# Patient Record
Sex: FEMALE
[De-identification: names, ages, dates, MRNs, and addresses within clinical notes are randomized per-mention and may not be internally consistent; named-entity substitution may affect disease eponyms.]

---

## 2018-06-29 ENCOUNTER — HOSPITAL ENCOUNTER (EMERGENCY)
Dept: HOSPITAL 68 - ERH | Age: 28
End: 2018-06-29
Payer: COMMERCIAL

## 2018-06-29 VITALS — SYSTOLIC BLOOD PRESSURE: 104 MMHG | DIASTOLIC BLOOD PRESSURE: 55 MMHG

## 2018-06-29 DIAGNOSIS — R10.11: Primary | ICD-10-CM

## 2018-06-29 DIAGNOSIS — R10.2: ICD-10-CM

## 2018-06-29 DIAGNOSIS — R10.33: ICD-10-CM

## 2018-06-29 LAB
ABSOLUTE GRANULOCYTE CT: 7.4 /CUMM (ref 1.4–6.5)
BASOPHILS # BLD: 0.1 /CUMM (ref 0–0.2)
BASOPHILS NFR BLD: 0.7 % (ref 0–2)
EOSINOPHIL # BLD: 0.1 /CUMM (ref 0–0.7)
EOSINOPHIL NFR BLD: 1.3 % (ref 0–5)
ERYTHROCYTE [DISTWIDTH] IN BLOOD BY AUTOMATED COUNT: 15.3 % (ref 11.5–14.5)
GRANULOCYTES NFR BLD: 68.5 % (ref 42.2–75.2)
HCT VFR BLD CALC: 41.8 % (ref 37–47)
LYMPHOCYTES # BLD: 2.6 /CUMM (ref 1.2–3.4)
MCH RBC QN AUTO: 27.4 PG (ref 27–31)
MCHC RBC AUTO-ENTMCNC: 33.3 G/DL (ref 33–37)
MCV RBC AUTO: 82.3 FL (ref 81–99)
MONOCYTES # BLD: 0.6 /CUMM (ref 0.1–0.6)
PLATELET # BLD: 197 /CUMM (ref 130–400)
PMV BLD AUTO: 9.5 FL (ref 7.4–10.4)
RED BLOOD CELL CT: 5.07 /CUMM (ref 4.2–5.4)
WBC # BLD AUTO: 10.8 /CUMM (ref 4.8–10.8)

## 2018-06-29 PROCEDURE — 87070 CULTURE OTHR SPECIMN AEROBIC: CPT

## 2018-06-29 NOTE — ED GI/GU/ABDOMINAL COMPLAINT
History of Present Illness
 
General
Chief Complaint: Abdominal Pain/Flank Pain
Stated Complaint: STOMACH PAIN, BREAST TENDERNESS +NAUSEA
Source: patient, old records
Exam Limitations: no limitations
 
Vital Signs & Intake/Output
Vital Signs & Intake/Output
 Vital Signs
 
 
Date Time Temp Pulse Resp B/P B/P Pulse O2 O2 Flow FiO2
 
     Mean Ox Delivery Rate 
 
06/29 2346 98.8 57 20 104/55  100 Room Air  
 
06/29 2049 99.0 84 20 102/60  98   
 
 
 
Allergies
Coded Allergies:
No Known Allergies (01/08/18)
 
Reconcile Medications
Ibuprofen 800 MG TABLET   1 TAB PO TID pain
Ibuprofen 600 MG TABLET   1 TAB PO TID PRN pain
     with food
Phenazopyridine HCl (Pyridium) 200 MG TABLET   1 TAB PO TID uti
Sulfamethoxazole/Trimethoprim (Bactrim Ds Tablet) 800 MG-160 MG TABLET   1 TAB 
PO BID uti
 
Triage Note:
PER PT "UTERUS PAIN" X 2 WEEKS LMP 6/14 ALSO
 NAUSEA AND LIGHTHEADNESS
Triage Nurses Notes Reviewed? yes
Pregnant? n
Is pt currently breastfeeding? No
Onset: Gradual
Duration: week(s):
Timing: recent history
Quality/Severity: moderate
Severity Numbers: 6
Location: periumbilical, right upper quadrant, suprapubic
HPI:
28-year-old female presents to emergency department complaining of "pain in 
uterus" 2.5 weeks.  Patient states she is having left upper quadrant and 
periumbilical pain beginning a few days ago.  Patient saw her OB/GYN 2 weeks ago
, transvaginal ultrasound was performed, patient states they noted that her 
right ovary was close to her uterus.  No swabs were performed at that time.  
Patient also reports painful intercourse recently.  Patient reports nausea and 
fevers yesterday.  The patient denies dysuria, hematuria, urinary frequency, 
vaginal discharge, new sexual partners, vomiting, diarrhea, constipation.  Last 
menstrual period 2 weeks ago.  Last bowel movement 2 days ago.
(Manjula Cali)
 
Past History
 
Travel History
Traveled to [place] past 21 day No
 
Medical History
Any Pertinent Medical History? none
Neurological: NONE
EENT: NONE
Cardiovascular: NONE
Respiratory: NONE
Gastrointestinal: NONE
Hepatic: NONE
Renal: NONE
Musculoskeletal: NONE
Psychiatric: NONE
Endocrine: NONE
Blood Disorders: NONE
Cancer(s): NONE
GYN/Reproductive: NONE
 
Surgical History
Surgical History: appendectomy
 
Psychosocial History
What is your primary language English
Tobacco Use: Never used
 
Family History
Hx Contributory? No
(Manjula Cali)
 
Review of Systems
 
Review of Systems
Constitutional:
Reports: see HPI. 
EENTM:
Reports: no symptoms. 
Respiratory:
Reports: no symptoms. 
Cardiovascular:
Reports: no symptoms. 
GI:
Reports: see HPI. 
Genitourinary:
Reports: see HPI. 
Musculoskeletal:
Reports: no symptoms. 
Skin:
Reports: no symptoms. 
Neurological/Psychological:
Reports: no symptoms. 
Hematologic/Endocrine:
Reports: no symptoms. 
Immunologic/Allergic:
Reports: no symptoms. 
All Other Systems: Reviewed and Negative
(Manjula Cali)
 
Physical Exam
 
Physical Exam
General Appearance: well developed/nourished, no apparent distress, alert, awake
Head: atraumatic, normal appearance
Eyes:
Bilateral: normal appearance. 
Ears, Nose, Throat, Mouth: hearing grossly normal
Neck: normal inspection, supple, full range of motion
Respiratory: normal breath sounds, no respiratory distress, lungs clear
Cardiovascular: regular rate/rhythm
Gastrointestinal: normal bowel sounds, soft, no organomegaly, RUQ, periumbilical
, suprapubic tenderness, no gaurding or rebound
Pelvic: normal external exam, no cerv. motion tender, white vaginal discharge 
without erythema
Back: normal inspection, normal range of motion
Extremities: normal range of motion
Neurologic/Psych: awake, alert, oriented x 3
Skin: intact, normal color, warm/dry
 
Core Measures
ACS in differential dx? No
Sepsis Present: No
Sepsis Focused Exam Completed? No
(Manjula Cali)
 
Progress
Differential Diagnosis: cholecystitis, intrauterine pregnancy, ovarian cyst, PID
/cervicitis, threatened AB, UTI/pyelo, endometriosis
Plan of Care:
 Orders
 
 
Procedure Date/time Status
 
TRICHOMONAS 06/29 2239 Complete
 
POTASSIUM HYDROXIDE (KOH) 06/29 2239 Complete
 
GENITAL CULTURE 06/29 2239 Active
 
CHLAMYDIA-GC DNA PROBE 06/29 2239 Active
 
URINALYSIS 06/29 2034 Complete
 
LIPASE 06/29 2034 Complete
 
HEPATIC FUNCTION PANEL 06/29 2034 Complete
 
HUMAN BETA HCG SCREEN 06/29 2034 Complete
 
CBC WITHOUT DIFFERENTIAL 06/29 2034 Complete
 
BASIC METABOLIC PANEL 06/29 2034 Complete
 
AMYLASE 06/29 2034 Complete
 
 
 Laboratory Tests
 
 
 
06/29/18 2155:
Urine Color YEL, Urine Clarity CLEAR, Urine pH 6.0, Ur Specific Gravity >= 1.030
, Urine Protein NEG, Urine Ketones NEG, Urine Nitrite NEG, Urine Bilirubin NEG, 
Urine Urobilinogen 0.2, Ur Leukocyte Esterase TRACE  H, Ur Microscopic SEDIMENT 
EXAMINED, Urine WBC 1-3  H, Ur Epithelial Cells MANY  H, Urine Bacteria MANY  H,
Urine Mucus FEW, Urine Hemoglobin NEG, Urine Glucose NEG
 
06/29/18 2125:
Anion Gap 12, Estimated GFR > 60, BUN/Creatinine Ratio 23.3, Glucose 87, Calcium
9.7, Total Bilirubin 0.3, Direct Bilirubin 0.1, AST 18, ALT 24, Alkaline 
Phosphatase 60, Total Protein 6.9, Albumin 4.0, Amylase 77, Lipase 261, Total 
Beta HCG NEGATIVE, CBC w Diff NO MAN DIFF REQ, RBC 5.07, MCV 82.3, MCH 27.4, 
MCHC 33.3, RDW 15.3  H, MPV 9.5, Gran % 68.5, Lymphocytes % 24.2, Monocytes % 
5.3, Eosinophils % 1.3, Basophils % 0.7, Absolute Granulocytes 7.4  H, Absolute 
Lymphocytes 2.6, Absolute Monocytes 0.6, Absolute Eosinophils 0.1, Absolute 
Basophils 0.1
 Microbiology
06/29 2241  GENITAL: GC DNA Probe - RECD
06/29 2241  GENITAL: Chlamydia DNA Probe (LUIS ARMANDO) - RECD
06/29 2241  GENITAL: CÉSAR Preparation - COMP
06/29 2241  GENITAL: Trichomonas Preparation - COMP
06/29 2241  GENITAL: Genital Culture - RECD
 
Patient's labs are stable, no acute abnormalities.  Pelvic exam is within normal
limits, no cervical motion tenderness to indicate PID.  She had a recent 
transvaginal ultrasound at her OB/GYN's office.  Patient does have some right 
upper quadrant abdominal tenderness however given stable labs outpatient 
ultrasound is appropriate given risk of radiation with CT imaging.  Patient 
states she has improved pain following Toradol.  She is sitting in stretcher 
with no acute distress, nontoxic appearing.  Patient to follow with outpatient 
ultrasound as well as her OB/GYN.  The patient agrees with the plan of care.  
The patient was discussed with Dr. Collado who agrees with this plan.
Initial ED EKG: none
(Meenakshi NOLEN,Manjula Contreras)
 
Departure
 
Departure
Disposition: HOME OR SELF CARE
Condition: Stable
Clinical Impression
Primary Impression: Abdominal pain
Qualifiers:  Abdominal location: unspecified location Qualified Code: R10.9 - 
Unspecified abdominal pain
Referrals:
Patient Has No Primary Care Dr (PCP/Family)
 
Additional Instructions:
You were given a slip for a ultrasound to take as an outpatient.  Follow-up with
your OB/GYN.  We will call you in 2 days if the results of your cultures are 
abnormal.  Return with any worsening symptoms or concerns.
 
Please note that there might be incidental findings in your evaluation that are 
unrelated to the current emergency department visit.  Please notify your primary
care doctor about this emergency department visit in order to obtain and review 
all of the testing performed so that these incidental findings can be monitored 
as needed.
 
If you had an x-ray performed, please understand that some fractures may not be 
seen on the initial set of x-rays.  If your symptoms persist you might need a 
repeat set of x-rays to check for such a fracture.
 
If you had a laceration evaluated, please understand that foreign bodies such as
glass or wood may not be visible to the naked eye or on plain x-rays.  If the 
wound becomes red, swollen, increasingly more painful or if there is any 
drainage from the wound, please have it reevaluated by a physician for the 
possibility of a retained foreign body.
 
If you're unable to follow up as outlined in the discharge instructions please 
return to the emergency department.
 
Thank you for choosing the [facility] Emergency Department for your care.
It was a pleasure to serve you today.
Departure Forms:
Customer Survey
General Discharge Information
Prescriptions:
Current Visit Scripts
Ibuprofen 1 TAB PO TID PRN pain 
     #30 TAB 
     with food
 
 
(Meenakshi NOLEN,Manjula Contreras)
 
PA/NP Co-Sign Statement
Statement:
ED Attending supervision documentation-
 
[] I saw and evaluated the patient. I have also reviewed all the pertinent lab 
results and diagnostic results. I agree with the findings and the plan of care 
as documented in the PA's/NP's documentation. 
 
[X] I have reviewed the ED Record and agree with the PA's/NP's documentation.
 
[] Additions or exceptions (if any) to the PAs/NP's note and plan are 
summarized below:
[]
 
(Tobias HOWARD,Colin NGUYEN)
 
 
(Tobias HOWARD,Colin NGUYEN)